# Patient Record
Sex: FEMALE | Race: WHITE | NOT HISPANIC OR LATINO | URBAN - METROPOLITAN AREA
[De-identification: names, ages, dates, MRNs, and addresses within clinical notes are randomized per-mention and may not be internally consistent; named-entity substitution may affect disease eponyms.]

---

## 2024-10-04 ENCOUNTER — OFFICE VISIT (OUTPATIENT)
Dept: URGENT CARE | Age: 24
End: 2024-10-04
Payer: COMMERCIAL

## 2024-10-04 VITALS
SYSTOLIC BLOOD PRESSURE: 117 MMHG | WEIGHT: 135 LBS | BODY MASS INDEX: 21.69 KG/M2 | HEART RATE: 80 BPM | TEMPERATURE: 97.7 F | OXYGEN SATURATION: 100 % | DIASTOLIC BLOOD PRESSURE: 77 MMHG | RESPIRATION RATE: 18 BRPM | HEIGHT: 66 IN

## 2024-10-04 DIAGNOSIS — K21.00 GASTROESOPHAGEAL REFLUX DISEASE WITH ESOPHAGITIS WITHOUT HEMORRHAGE: Primary | ICD-10-CM

## 2024-10-04 RX ORDER — OMEPRAZOLE 40 MG/1
40 CAPSULE, DELAYED RELEASE ORAL
Qty: 30 CAPSULE | Refills: 0 | Status: SHIPPED | OUTPATIENT
Start: 2024-10-04 | End: 2025-10-04

## 2024-10-04 ASSESSMENT — PATIENT HEALTH QUESTIONNAIRE - PHQ9
2. FEELING DOWN, DEPRESSED OR HOPELESS: NOT AT ALL
1. LITTLE INTEREST OR PLEASURE IN DOING THINGS: NOT AT ALL
SUM OF ALL RESPONSES TO PHQ9 QUESTIONS 1 AND 2: 0

## 2024-10-04 NOTE — PROGRESS NOTES
"Subjective   Patient ID: Brandie Carbone is a 24 y.o. female. They present today with a chief complaint of GERD (Having trouble taking in full breath. Acid reflux after eating or drinking, feeling of something stuck in throat after. Going on x3-4 days. ).  Patient states she is yawning more than usual.  She states the discomfort is in her mid epigastric region.  She has not thrown up but states that the reflux of acid sometimes comes up into her mouth.  No diarrhea.  No black or bloody stools.  No fevers or chills.  No chest pains.  Patient is taking Pepto and Mylanta once with some relief.    History of Present Illness  HPI  Patient presents with 3 to 4 days of acid reflux giving her a sensation of shortness of breath and lump in throat.  Symptoms are worse after eating or drinking.  Past Medical History  Allergies as of 10/04/2024    (No Known Allergies)       (Not in a hospital admission)       Past Medical History:   Diagnosis Date    Personal history of other mental and behavioral disorders 06/28/2017    History of anxiety       No past surgical history on file.         Review of Systems  Review of Systems as in history of present illness                               Objective    Vitals:    10/04/24 1048   BP: 117/77   BP Location: Left arm   Patient Position: Sitting   Pulse: 80   Resp: 18   Temp: 36.5 °C (97.7 °F)   TempSrc: Oral   SpO2: 100%   Weight: 61.2 kg (135 lb)   Height: 1.676 m (5' 6\")     Patient's last menstrual period was 09/13/2024 (exact date).    Physical Exam  General: Vitals noted, no distress. Afebrile.   EENT: TMs clear. Posterior oropharynx unremarkable.   Cardiac: Regular, rate, rhythm, no murmur.   Pulmonary: Lungs clear bilaterally with good aeration. No adventitious breath sounds.   Abdomen: Soft, nonsurgical.  Mild midepigastric tenderness with no guarding or rebound. No peritoneal signs. Normoactive bowel sounds.   Extremities: No peripheral edema. Negative Homans bilaterally, no " cords.   Skin: No rash.   Neuro: No focal neurologic deficits, NIH score of 0.    Procedures    Point of Care Test & Imaging Results from this visit  No results found for this visit on 10/04/24.   No results found.    Diagnostic study results (if any) were reviewed by Misha Moon MD.    Assessment/Plan   Allergies, medications, history, and pertinent labs/EKGs/Imaging reviewed by Misha Moon MD.     Medical Decision Making  At time of discharge patient was clinically well-appearing and HDS for outpatient management. The patient and/or family was educated regarding diagnosis, supportive care, OTC and Rx medications. The patient and/or family was given the opportunity to ask questions prior to discharge.  They verbalized understanding of my discussion of the plans for treatment, expected course, indications to return to  or seek further evaluation in ED, and the need for timely follow up as directed.   They were provided with a work/school excuse if requested.    Orders and Diagnoses  Diagnoses and all orders for this visit:  Gastroesophageal reflux disease with esophagitis without hemorrhage  -     omeprazole (PriLOSEC) 40 mg DR capsule; Take 1 capsule (40 mg) by mouth once daily in the morning. Take before meals. Do not crush or chew.      Medical Admin Record      Patient disposition: Home    Electronically signed by Misha Moon MD  11:06 AM

## 2024-10-04 NOTE — PATIENT INSTRUCTIONS
Take medication daily as directed for 1 month  See PCP in 1 week if no improvement   avoid nicotine, caffeine, alcohol, chocolate or mint  May see gastroenterologist in 3 days as scheduled if symptoms persist  Go to ER for increasing abdominal or chest pain  Gaviscon or other antacid as needed for temporary relief